# Patient Record
Sex: MALE | Race: WHITE | NOT HISPANIC OR LATINO | Employment: STUDENT | URBAN - METROPOLITAN AREA
[De-identification: names, ages, dates, MRNs, and addresses within clinical notes are randomized per-mention and may not be internally consistent; named-entity substitution may affect disease eponyms.]

---
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
## 2024-10-07 ENCOUNTER — OFFICE VISIT (OUTPATIENT)
Dept: URGENT CARE | Facility: CLINIC | Age: 19
End: 2024-10-07
Payer: OTHER MISCELLANEOUS

## 2024-10-07 VITALS
BODY MASS INDEX: 22.56 KG/M2 | HEIGHT: 71 IN | TEMPERATURE: 98 F | OXYGEN SATURATION: 99 % | WEIGHT: 161.19 LBS | DIASTOLIC BLOOD PRESSURE: 68 MMHG | HEART RATE: 72 BPM | RESPIRATION RATE: 18 BRPM | SYSTOLIC BLOOD PRESSURE: 109 MMHG

## 2024-10-07 DIAGNOSIS — S09.8XXA BLUNT HEAD INJURY, INITIAL ENCOUNTER: ICD-10-CM

## 2024-10-07 DIAGNOSIS — S01.01XA SCALP LACERATION, INITIAL ENCOUNTER: Primary | ICD-10-CM

## 2024-10-07 DIAGNOSIS — Z02.6 ENCOUNTER RELATED TO WORKER'S COMPENSATION CLAIM: ICD-10-CM

## 2024-10-07 RX ORDER — MUPIROCIN 20 MG/G
OINTMENT TOPICAL
Qty: 22 G | Refills: 1 | Status: SHIPPED | OUTPATIENT
Start: 2024-10-07

## 2024-10-07 NOTE — PROGRESS NOTES
Subjective:      Patient ID: Jamie Ashraf III is a 19 y.o. male.    Chief Complaint: Head Injury (HEAD LACERATION)    Patient's place of employment - [employer]   Patient's job title - APPRENTICE   Date of injury - 10/7/2024  Body part injured including left or right - HEAD LACERATION  Injury Mechanism - HIT HEAD ON THE CORNER OF A STEEL BEAM WHEN STANDING UP  What they were doing when they got hurt - WORKING   What they did immediately after - [facility]   Pain scale right now - 5/10    KSD    UTD ON TD VACC, 3 YEAR AGO TETANUS    PATIENT IS A 19-YEAR-OLD MALE  WHO WAS WALKING AND CORNER OF A STEEL BEAM CUT HIS RIGHT FOREHEAD/SCALP ALONG THE HAIRLINE.  THERE WAS SIGNIFICANT BLEEDING HOWEVER HAS STOPPED.  PHYSICAL EXAM SHOWS A 2 CM LINEAR LACERATION ALONG THE HAIRLINE.  NO LOSS OF CONSCIOUSNESS NO NAUSEA NO VOMITING NO WEAKNESS IN THE ARMS OR LEGS NO SIGNIFICANT HEADACHE.  HIS LAST TETANUS SHOT WAS 3 YEARS AGO.  WOUND CLEANED WITH BETADINE AND 2 STAPLES PLACED WITH GOOD HEMOSTASIS AND CLOSURE.  HE WILL BE ALLOWED TO WORK REGULAR DUTY WITHOUT RESTRICTIONS AND RETURN IN 1 WEEK FOR STAPLE REMOVAL AND DISCHARGE.    Head Injury   Pertinent negatives include no headaches or numbness.       ROS  Constitution: Negative for chills and fever.   HENT:  Negative for postnasal drip, sinus pain and sore throat.    Neck: Negative for neck pain and neck stiffness.   Cardiovascular:  Negative for chest pain and palpitations.   Respiratory:  Negative for cough and shortness of breath.    Genitourinary:  Negative for dysuria and hematuria.   Musculoskeletal:  Positive for pain. Negative for joint pain.   Skin:  Positive for wound and laceration. Negative for erythema.   Neurological:  Negative for dizziness, light-headedness, speech difficulty, headaches, altered mental status, numbness and tingling.   Psychiatric/Behavioral:  Negative for altered mental status.      Objective:     Physical  Exam  Vitals and nursing note reviewed.   Constitutional:       Appearance: He is well-developed.   HENT:      Head: Normocephalic. No abrasion, contusion or laceration.      Comments: THERE IS DRIED BLOOD ALONG THE HAIRLINE.  DETAILED EXAM SHOWS A 2 CM LACERATION WHICH DOES BLEED WHEN CLEANED AND NEEDS CLOSURE.  AMENABLE  TO STAPLES.  THE LACERATION IS IN THE HAIR/HAIRLINE.  SEE PROCEDURE NOTE. NO HEMATOMA NO EXPOSED SKULL NO PAIN ON AP AND LATERAL COMPRESSION OF THE SKULL.      Right Ear: External ear normal.      Left Ear: External ear normal.      Nose: Nose normal.   Eyes:      General: Lids are normal.      Conjunctiva/sclera: Conjunctivae normal.      Pupils: Pupils are equal, round, and reactive to light.   Neck:      Trachea: Trachea and phonation normal.   Cardiovascular:      Rate and Rhythm: Normal rate and regular rhythm.      Heart sounds: Normal heart sounds.   Pulmonary:      Effort: Pulmonary effort is normal. No respiratory distress.      Breath sounds: Normal breath sounds. No stridor.   Musculoskeletal:         General: Normal range of motion.      Cervical back: Full passive range of motion without pain and neck supple.   Skin:     General: Skin is warm and dry.      Capillary Refill: Capillary refill takes less than 2 seconds.      Findings: No abrasion, bruising, burn, ecchymosis, erythema, laceration, lesion or rash.   Neurological:      General: No focal deficit present.      Mental Status: He is alert and oriented to person, place, and time. Mental status is at baseline.      Cranial Nerves: No cranial nerve deficit.      Sensory: No sensory deficit.      Motor: No weakness.      Coordination: Coordination normal.   Psychiatric:         Speech: Speech normal.         Behavior: Behavior normal.         Thought Content: Thought content normal.         Judgment: Judgment normal.      Laceration Repair    Date/Time: 10/7/2024 12:35 PM    Performed by: Grey Yuan MD  Authorized by:  "Grey Yuan MD  Consent Done: Yes  Consent: Verbal consent obtained. Written consent not obtained.  Risks and benefits: risks, benefits and alternatives were discussed  Consent given by: patient  Patient understanding: patient states understanding of the procedure being performed  Imaging studies: imaging studies available  Required items: required blood products, implants, devices, and special equipment available  Patient identity confirmed: name and verbally with patient  Time out: Immediately prior to procedure a "time out" was called to verify the correct patient, procedure, equipment, support staff and site/side marked as required.  Body area: head/neck  Location details: scalp  Laceration length: 2 cm  Foreign bodies: no foreign bodies  Tendon involvement: none  Nerve involvement: none  Vascular damage: no    Anesthesia:  Anesthetic total: 0 mL    Patient sedated: no  Preparation: Patient was prepped and draped in the usual sterile fashion.  Irrigation solution: saline  Irrigation method: syringe  Amount of cleaning: standard  Debridement: minimal  Degree of undermining: none  Skin closure: staples  Number of sutures: 2  Technique: simple  Approximation: close  Approximation difficulty: simple  Dressing: 4x4 sterile gauze and antibiotic ointment  Patient tolerance: Patient tolerated the procedure well with no immediate complications  Comments: GOOD COSMESIS  GOOD HEMOSTASIS  NO COMPLICATIONS  GIVEN INSTRUCTIONS OF WHEN TO RETURN FOR STAPLE REMOVAL        Assessment:      1. Scalp laceration, initial encounter    2. Encounter related to worker's compensation claim    3. Blunt head injury, initial encounter      Plan:     PATIENT IS A 19-YEAR-OLD MALE  WHO WAS WALKING AND CORNER OF A STEEL BEAM CUT HIS RIGHT FOREHEAD/SCALP ALONG THE HAIRLINE.  THERE WAS SIGNIFICANT BLEEDING HOWEVER HAS STOPPED.  PHYSICAL EXAM SHOWS A 2 CM LINEAR LACERATION ALONG THE HAIRLINE.  NO LOSS OF " CONSCIOUSNESS NO NAUSEA NO VOMITING NO WEAKNESS IN THE ARMS OR LEGS NO SIGNIFICANT HEADACHE.  HIS LAST TETANUS SHOT WAS 3 YEARS AGO.  WOUND CLEANED WITH BETADINE AND 2 STAPLES PLACED WITH GOOD HEMOSTASIS AND CLOSURE.  HE WILL BE ALLOWED TO WORK REGULAR DUTY WITHOUT RESTRICTIONS AND RETURN IN 1 WEEK FOR STAPLE REMOVAL AND DISCHARGE.    Medications Ordered This Encounter   Medications    mupirocin (BACTROBAN) 2 % ointment     Sig: Apply to affected area 3 times daily     Dispense:  22 g     Refill:  1     Patient Instructions: Keep dressing clean/dry/covered, Attention not to aggravate affected area   Restrictions: Regular Duty  Follow up in about 1 week (around 10/14/2024) for STAPLE REMOVAL IN ONE WEEK.

## 2024-10-07 NOTE — LETTER
[facility] [address] [address]  Phone: [phone]  Fax: [phone]  [facility]: [id number]    Pt Name: Jamie Ashraf III  Injury Date: 10/07/2024   Employee ID:  Date of First Treatment: 10/07/2024   Company: [employer]      Appointment Time: 12:20 PM Arrived: 12:30 PM   Provider: Grey Yuan MD Time Out: 1:08 PM     Office Treatment:   1. Scalp laceration, initial encounter    2. Encounter related to worker's compensation claim      Medications Ordered This Encounter   Medications    mupirocin (BACTROBAN) 2 % ointment      Patient Instructions: Keep dressing clean/dry/covered, Attention not to aggravate affected area      Restrictions: Regular Duty     Return Appointment: 10/14/2024 at 10:00 AM    RENETTA

## 2024-10-15 ENCOUNTER — TELEPHONE (OUTPATIENT)
Dept: URGENT CARE | Facility: CLINIC | Age: 19
End: 2024-10-15
Payer: MEDICAID

## 2024-10-15 NOTE — TELEPHONE ENCOUNTER
Called patient in reference to his missed [facility] Appointment and there was no answer, I received the patients voicemail, left a message for the patient and the reason for the call.

## 2024-10-16 ENCOUNTER — OFFICE VISIT (OUTPATIENT)
Dept: URGENT CARE | Facility: CLINIC | Age: 19
End: 2024-10-16
Payer: OTHER MISCELLANEOUS

## 2024-10-16 VITALS
DIASTOLIC BLOOD PRESSURE: 68 MMHG | OXYGEN SATURATION: 98 % | HEIGHT: 71 IN | WEIGHT: 161 LBS | SYSTOLIC BLOOD PRESSURE: 112 MMHG | HEART RATE: 72 BPM | TEMPERATURE: 98 F | RESPIRATION RATE: 18 BRPM | BODY MASS INDEX: 22.54 KG/M2

## 2024-10-16 DIAGNOSIS — S09.8XXD BLUNT HEAD TRAUMA, SUBSEQUENT ENCOUNTER: ICD-10-CM

## 2024-10-16 DIAGNOSIS — Z02.6 ENCOUNTER RELATED TO WORKER'S COMPENSATION CLAIM: ICD-10-CM

## 2024-10-16 DIAGNOSIS — S01.01XD LACERATION OF SCALP, SUBSEQUENT ENCOUNTER: Primary | ICD-10-CM

## 2024-10-16 PROCEDURE — 99213 OFFICE O/P EST LOW 20 MIN: CPT | Mod: S$GLB,,, | Performed by: SURGERY

## 2024-10-16 PROCEDURE — 15853 REMOVAL SUTR/STAPL XREQ ANES: CPT | Mod: S$GLB,,, | Performed by: SURGERY

## 2024-10-16 NOTE — PROGRESS NOTES
Subjective:      Patient ID: Jamie Ashraf III is a 19 y.o. male.    Chief Complaint: Laceration (Head)    Patient's place of employment - [employer]  Patient's job title - Apprentice  Date of Injury - 10-07-24  Body part injured - Head  Current work status per last visit - Regular Duty  Improved, same, or worse - Improved  Pain Scale right now (1-10) -  0/10    Laceration       Musculoskeletal:  Negative for pain, trauma, joint pain, joint swelling, pain with walking, muscle cramps and muscle ache.   Skin:  Positive for laceration. Negative for erythema and bruising.   Allergic/Immunologic: Positive for itching.       See MA note above. Begin MD note:    Jamie Ashraf III is a 19 y.o. presenting for follow-up of scalp laceration. Laceration itching, otherwise no issues.     Objective:     Physical Exam  Vitals and nursing note reviewed.   Constitutional:       General: He is not in acute distress.     Appearance: He is not ill-appearing.   HENT:      Head: Normocephalic. Laceration present.        Comments: 2 staples in place to right hairline. Removed per procedure note.   Eyes:      Conjunctiva/sclera: Conjunctivae normal.   Pulmonary:      Effort: Pulmonary effort is normal. No respiratory distress.   Skin:     General: Skin is warm.      Coloration: Skin is not pale.      Findings: No erythema.   Neurological:      General: No focal deficit present.      Mental Status: He is alert and oriented to person, place, and time.      GCS: GCS eye subscore is 4. GCS verbal subscore is 5. GCS motor subscore is 6.      Motor: Motor function is intact.      Coordination: Coordination is intact.   Psychiatric:         Attention and Perception: Attention normal.         Mood and Affect: Mood normal.         Speech: Speech normal.         Behavior: Behavior normal. Behavior is cooperative.         Thought Content: Thought content normal.        Assessment:      1. Laceration of scalp, subsequent encounter    2.  Encounter related to worker's compensation claim    3. Blunt head trauma, subsequent encounter      Plan:     Staples removed per procedure note. Regular Duty. Discharge from [facility].             Diagnoses and plan discussed with the patient, all questions and concerns were addressed prior to discharge. Follow-up as needed if any reoccurrence of symptoms related to the present condition. Plan was developed with active input from the patient and they verbalized understanding of and agreement with the POC.   Note was dictated with voice recognition software, please excuse any grammatical errors.    I spent a total of 20 minutes on the day of the visit.  This includes face to face time and non-face to face time preparing to see the patient (eg, review of tests), obtaining and/or reviewing separately obtained history, documenting clinical information in the electronic or other health record, independently interpreting results and communicating results to the patient/family/caregiver, or care coordinator.     Restrictions: Regular Duty  Follow up if symptoms worsen or fail to improve.

## 2024-10-16 NOTE — LETTER
[facility]  [address] [address]  Phone: [phone]  Fax: [phone]  [facility]: [id number]     Name: Jamie Ashraf III  Injury Date: 10/07/2024   Employee ID:  Date of Treatment: 10/16/2024   Company: [employer]      Appointment Time: 03:00 PM Arrived: 2:46 PM   Provider: Leigh Smyth MD Time Out:3:21 PM      Office Treatment:   1. Encounter related to worker's compensation claim               Restrictions: Regular Duty     Return As needed. JOHNNY

## 2024-10-16 NOTE — PROCEDURES
Suture Removal    Date/Time: 10/16/2024 3:00 PM  Location procedure was performed: [facility]    Performed by: Leigh Smyth MD  Authorized by: Leigh Smyth MD  Body area: head/neck  Location details: scalp  Wound Appearance: clean, well healed, normal color, nontender, no drainage and nonpurulent  Sutures Removed: 0  Staples Removed: 2  Post-removal: no dressing applied  Patient tolerance: Patient tolerated the procedure well with no immediate complications

## 2025-06-23 ENCOUNTER — TELEPHONE (OUTPATIENT)
Dept: PEDIATRICS | Facility: CLINIC | Age: 20
End: 2025-06-23
Payer: OTHER MISCELLANEOUS

## 2025-06-23 NOTE — TELEPHONE ENCOUNTER
[place]oyok with dad, referred him to medical records dept [phone]      Copied from CRM #[id number]. Topic: General Inquiry - Patient Advice  >> Jun 23, 2025 12:21 PM Med Assistant Francisco J wrote:  Medical Records Request    Caller:PT Dalia    Reason For Call:Dad is requesting medical records of pt  ADHD diagnosis with provider to be emailed to address below.    Email address:[email or web address]    Best Call Back:[phone]